# Patient Record
Sex: MALE | Race: WHITE | NOT HISPANIC OR LATINO
[De-identification: names, ages, dates, MRNs, and addresses within clinical notes are randomized per-mention and may not be internally consistent; named-entity substitution may affect disease eponyms.]

---

## 2020-03-02 PROBLEM — Z00.00 ENCOUNTER FOR PREVENTIVE HEALTH EXAMINATION: Status: ACTIVE | Noted: 2020-03-02

## 2020-03-03 ENCOUNTER — APPOINTMENT (OUTPATIENT)
Dept: SURGERY | Facility: CLINIC | Age: 30
End: 2020-03-03
Payer: COMMERCIAL

## 2020-03-03 VITALS
BODY MASS INDEX: 28.63 KG/M2 | WEIGHT: 200 LBS | SYSTOLIC BLOOD PRESSURE: 127 MMHG | HEIGHT: 70 IN | DIASTOLIC BLOOD PRESSURE: 86 MMHG | HEART RATE: 78 BPM

## 2020-03-03 PROCEDURE — 10060 I&D ABSCESS SIMPLE/SINGLE: CPT

## 2020-03-03 PROCEDURE — 99203 OFFICE O/P NEW LOW 30 MIN: CPT | Mod: 25

## 2020-03-03 NOTE — ASSESSMENT
[FreeTextEntry1] : PNC inflamed, I&D recommended\par \par Procedure: Incision and drainage\par The superior was prepped with betadine. 4.5 5cc of lidocaine with epi mixed with 0.5 bicarbonate was used to anesthetize the region. A # 11 blade was used to incise into the abscess with a rush of pus and chronic hair within the cyst noted. The opening was dilated slightly with a clamp. After the pus was expressed, the area was covered with a clean dry dressing. the patient tolerated the procedure well.\par

## 2020-03-03 NOTE — HISTORY OF PRESENT ILLNESS
[de-identified] : Pt presents with worsening buttock crease swel;ling and pain c/w PNC. He has had a dimple in the crease for years but never pain or swelling. Recently it has become more painful without skin changes but a bulge has developed.

## 2020-03-03 NOTE — PHYSICAL EXAM
[Normal Heart Sounds] : normal heart sounds [Normal Breath Sounds] : Normal breath sounds [de-identified] : NAD [de-identified] : buttock crease swelling c/w chronic PNC

## 2020-03-03 NOTE — PLAN
[FreeTextEntry1] : Formal excision of pilonidal cyst recommended in OR. Primary closure possible and discussed.He will schedule this within the next 2 to 3 weeks.

## 2020-05-21 ENCOUNTER — APPOINTMENT (OUTPATIENT)
Dept: SURGERY | Facility: CLINIC | Age: 30
End: 2020-05-21
Payer: COMMERCIAL

## 2020-05-21 DIAGNOSIS — L05.91 PILONIDAL CYST W/OUT ABSCESS: ICD-10-CM

## 2020-05-21 PROCEDURE — 99441: CPT

## 2020-05-21 NOTE — ASSESSMENT
[FreeTextEntry1] : with telemedicine visual (limited) there does not appear to be any residual PNC present. \par He had been initially postponed from March 3rd due to Covid pandemia and seems to have resolved all symptoms.

## 2020-05-21 NOTE — PLAN
[FreeTextEntry1] : At this point i would recommend continuation of postponing any surgical PNC present, would proceed with planned excision. If not, would continue to watch and wait. He agrees to the plan.

## 2020-06-01 ENCOUNTER — APPOINTMENT (OUTPATIENT)
Dept: SURGERY | Facility: HOSPITAL | Age: 30
End: 2020-06-01